# Patient Record
Sex: FEMALE | Race: AMERICAN INDIAN OR ALASKA NATIVE
[De-identification: names, ages, dates, MRNs, and addresses within clinical notes are randomized per-mention and may not be internally consistent; named-entity substitution may affect disease eponyms.]

---

## 2018-04-05 ENCOUNTER — HOSPITAL ENCOUNTER (EMERGENCY)
Dept: HOSPITAL 31 - C.ER | Age: 23
Discharge: HOME | End: 2018-04-05
Payer: COMMERCIAL

## 2018-04-05 VITALS
TEMPERATURE: 98.3 F | HEART RATE: 86 BPM | OXYGEN SATURATION: 99 % | DIASTOLIC BLOOD PRESSURE: 84 MMHG | SYSTOLIC BLOOD PRESSURE: 126 MMHG | RESPIRATION RATE: 18 BRPM

## 2018-04-05 DIAGNOSIS — T17.1XXA: Primary | ICD-10-CM
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
## 2018-04-05 NOTE — C.PDOC
History Of Present Illness


22-year-old female presents to the ED complaining of pain to her right nare. 

She states she had a piercing there, done 3 weeks ago, and today around 4pm she 

noticed the nose ring became stuck in the nostril. Yesterday the piercing was 

in place with no swelling, pain, or itchiness. She noticed it today after 

waking up, and states there was some blood. She has otherwise had no problems 

with the piercing and has been cleaning it as instructed. 





Time Seen by Provider: 04/05/18 01:51


Chief Complaint (Nursing): ENT Problem


History Per: Patient


History/Exam Limitations: None


Onset/Duration Of Symptoms: Hrs


Current Symptoms Are (Timing): Still Present





Past Medical History


Reviewed: Historical Data, Nursing Documentation, Vital Signs


Vital Signs: 


 Last Vital Signs











Temp  98.3 F   04/05/18 01:41


 


Pulse  86   04/05/18 01:41


 


Resp  18   04/05/18 01:41


 


BP  126/84   04/05/18 01:41


 


Pulse Ox  99   04/05/18 02:13














- Medical History


PMH: No Chronic Diseases


Surgical History: No Surg Hx


Family History: States: No Known Family Hx





- Social History


Hx Tobacco Use: No


Hx Alcohol Use: Yes


Hx Substance Use: No





- Immunization History


Hx Tetanus Toxoid Vaccination: No


Hx Influenza Vaccination: No


Hx Pneumococcal Vaccination: No





Review Of Systems


ENT: Positive for: Nose Pain (with nose ring stuck inside)





Physical Exam





- Physical Exam


Appears: Non-toxic, No Acute Distress


Skin: Warm, Dry, No Rash


Head: Atraumatic, Normacephalic


Eye(s): bilateral: Normal Inspection, EOMI


Nose: Other (nose ring stuck on right side)


Oral Mucosa: Moist


Neck: Normal ROM


Extremity: Bilateral: Atraumatic, Normal ROM


Neurological/Psych: Oriented x3, Normal Speech





ED Course And Treatment


O2 Sat by Pulse Oximetry: 99 (RA)


Pulse Ox Interpretation: Normal





Medical Decision Making


Medical Decision Making: 


Impression: 22 year old with foreign body to right nare





Plan:


Patient seen and evaluated by ED attending, DR. Sánchez, and foreign body 

successfully removed











Disposition


Counseled Patient/Family Regarding: Diagnosis, Need For Followup





- Disposition


Disposition: HOME/ ROUTINE


Disposition Time: 02:11


Condition: STABLE


Additional Instructions: 


Take antibiotic to prevent infection


Keep area clean 


Prescriptions: 


Amoxicillin [Amoxil 500 mg Cap] 500 mg PO Q12 #14 cap


Instructions:  Removing Objects Stuck Up the Nose


Forms:  [employer] (English)





- POA


Present On Arrival: None





- Clinical Impression


Clinical Impression: 


 Foreign body of nose








- PA / NP / Resident Statement


MD/DO has reviewed & agrees with the documentation as recorded.





- Scribe Statement


The provider has reviewed the documentation as recorded by the Scribe (Samantha Whiting)





All medical record entries made by the Scribe were at my direction and 

personally dictated by me. I have reviewed the chart and agree that the record 

accurately reflects my personal performance of the history, physical exam, 

medical decision making, and the department course for this patient. I have 

also personally directed, reviewed, and agree with the discharge instructions 

and disposition.





Procedures





- FB Removal Nose


  ** Nostril Right


Location: Nostril Right


Sustected FB Material: Other (jewelry)


FB Removal Technique: Other (forceps)


Patient Tolorated Procedure: Well


Complications: None